# Patient Record
Sex: MALE | Race: WHITE | URBAN - METROPOLITAN AREA
[De-identification: names, ages, dates, MRNs, and addresses within clinical notes are randomized per-mention and may not be internally consistent; named-entity substitution may affect disease eponyms.]

---

## 2018-08-31 ENCOUNTER — HOSPITAL ENCOUNTER (OUTPATIENT)
Dept: MEDSURG UNIT | Facility: HOSPITAL | Age: 38
End: 2018-09-02
Attending: INTERNAL MEDICINE | Admitting: INTERNAL MEDICINE

## 2018-08-31 LAB
ABS NEUT (OLG): 20.31 X10(3)/MCL (ref 2.1–9.2)
ALBUMIN SERPL-MCNC: 1.8 GM/DL (ref 3.4–5)
ALBUMIN/GLOB SERPL: 0 RATIO (ref 1–2)
ALP SERPL-CCNC: 137 UNIT/L (ref 45–117)
ALT SERPL-CCNC: 43 UNIT/L (ref 12–78)
AMPHET UR QL SCN: NEGATIVE
APPEARANCE, UA: CLEAR
APTT PPP: 32.7 SECOND(S) (ref 23.3–37)
AST SERPL-CCNC: 47 UNIT/L (ref 15–37)
BACTERIA #/AREA URNS AUTO: ABNORMAL /[HPF]
BARBITURATE SCN PRESENT UR: NEGATIVE
BASOPHILS NFR BLD MANUAL: 0 %
BENZODIAZ UR QL SCN: POSITIVE
BILIRUB SERPL-MCNC: 0.7 MG/DL (ref 0.2–1)
BILIRUB UR QL STRIP: NEGATIVE
BILIRUBIN DIRECT+TOT PNL SERPL-MCNC: 0.2 MG/DL
BILIRUBIN DIRECT+TOT PNL SERPL-MCNC: 0.5 MG/DL
BUN SERPL-MCNC: 21 MG/DL (ref 7–18)
CALCIUM SERPL-MCNC: 8.7 MG/DL (ref 8.5–10.1)
CANNABINOIDS UR QL SCN: NEGATIVE
CHLORIDE SERPL-SCNC: 95 MMOL/L (ref 98–107)
CO2 SERPL-SCNC: 26 MMOL/L (ref 21–32)
COCAINE UR QL SCN: NEGATIVE
COLOR UR: YELLOW
CREAT SERPL-MCNC: 1.1 MG/DL (ref 0.6–1.3)
CRP SERPL-MCNC: 25 MG/DL
EOSINOPHIL NFR BLD MANUAL: 0 %
ERYTHROCYTE [DISTWIDTH] IN BLOOD BY AUTOMATED COUNT: 13 % (ref 11.5–14.5)
GLOBULIN SER-MCNC: 6.2 GM/ML (ref 2.3–3.5)
GLUCOSE (UA): NORMAL
GLUCOSE SERPL-MCNC: 124 MG/DL (ref 74–106)
GRANULOCYTES NFR BLD MANUAL: 80 % (ref 43–75)
HCT VFR BLD AUTO: 37.4 % (ref 40–51)
HGB BLD-MCNC: 12.8 GM/DL (ref 13.5–17.5)
HGB UR QL STRIP: NEGATIVE
HYALINE CASTS #/AREA URNS LPF: ABNORMAL /[LPF]
INR PPP: 1.31 (ref 0.9–1.2)
KETONES UR QL STRIP: NEGATIVE
LACTATE SERPL-SCNC: 0.9 MMOL/L (ref 0.4–2)
LEUKOCYTE ESTERASE UR QL STRIP: 25 LEU/UL
LYMPHOCYTES NFR BLD MANUAL: 2 %
LYMPHOCYTES NFR BLD MANUAL: 5 % (ref 20.5–51.1)
MCH RBC QN AUTO: 29.3 PG (ref 26–34)
MCHC RBC AUTO-ENTMCNC: 34.2 GM/DL (ref 31–37)
MCV RBC AUTO: 85.6 FL (ref 80–100)
MONOCYTES NFR BLD MANUAL: 4 % (ref 2–9)
MUCOUS THREADS URNS QL MICRO: ABNORMAL
NEUTS BAND NFR BLD MANUAL: 9 % (ref 0–10)
NITRITE UR QL STRIP: NEGATIVE
OPIATES UR QL SCN: POSITIVE
PCP UR QL: NEGATIVE
PH UR STRIP.AUTO: 5.5 [PH] (ref 5–8)
PH UR STRIP: 6 [PH] (ref 4.5–8)
PLATELET # BLD AUTO: 464 X10(3)/MCL (ref 130–400)
PLATELET # BLD EST: ABNORMAL 10*3/UL
PMV BLD AUTO: 10.9 FL (ref 7.4–10.4)
POTASSIUM SERPL-SCNC: 4.3 MMOL/L (ref 3.5–5.1)
PROT SERPL-MCNC: 8 GM/DL (ref 6.4–8.2)
PROT UR QL STRIP: 20 MG/DL
PROTHROMBIN TIME: 15.5 SECOND(S) (ref 11.9–14.4)
RBC # BLD AUTO: 4.37 X10(6)/MCL (ref 4.5–5.9)
RBC #/AREA URNS AUTO: ABNORMAL /[HPF]
RBC MORPH BLD: NORMAL
SODIUM SERPL-SCNC: 130 MMOL/L (ref 136–145)
SP GR UR STRIP: 1.03 (ref 1–1.03)
SQUAMOUS #/AREA URNS LPF: ABNORMAL /[LPF]
TEMPERATURE, URINE (OHS): 25 DEGC (ref 20–25)
UROBILINOGEN UR STRIP-ACNC: 8 MG/DL
WBC # SPEC AUTO: 24.6 X10(3)/MCL (ref 4.5–11)
WBC #/AREA URNS AUTO: ABNORMAL /HPF

## 2018-09-01 LAB
ABS NEUT (OLG): 16.07 X10(3)/MCL (ref 2.1–9.2)
ALBUMIN SERPL-MCNC: 1.6 GM/DL (ref 3.4–5)
ALBUMIN/GLOB SERPL: 0 RATIO (ref 1–2)
ALP SERPL-CCNC: 144 UNIT/L (ref 45–117)
ALT SERPL-CCNC: 67 UNIT/L (ref 12–78)
AST SERPL-CCNC: 96 UNIT/L (ref 15–37)
BASOPHILS # BLD AUTO: 0.07 X10(3)/MCL
BASOPHILS NFR BLD AUTO: 0 %
BILIRUB SERPL-MCNC: 0.9 MG/DL (ref 0.2–1)
BILIRUBIN DIRECT+TOT PNL SERPL-MCNC: 0.3 MG/DL
BILIRUBIN DIRECT+TOT PNL SERPL-MCNC: 0.6 MG/DL
BUN SERPL-MCNC: 21 MG/DL (ref 7–18)
CALCIUM SERPL-MCNC: 8.1 MG/DL (ref 8.5–10.1)
CHLORIDE SERPL-SCNC: 100 MMOL/L (ref 98–107)
CO2 SERPL-SCNC: 26 MMOL/L (ref 21–32)
CORTIS SERPL-SCNC: 24 MCG/DL
CREAT SERPL-MCNC: 1 MG/DL (ref 0.6–1.3)
D DIMER PPP IA.FEU-MCNC: 6.2 MCG/ML FEU
EOSINOPHIL # BLD AUTO: 0.05 X10(3)/MCL
EOSINOPHIL NFR BLD AUTO: 0 %
ERYTHROCYTE [DISTWIDTH] IN BLOOD BY AUTOMATED COUNT: 13.2 % (ref 11.5–14.5)
ERYTHROCYTE [SEDIMENTATION RATE] IN BLOOD: 103 MM/HR (ref 0–15)
GLOBULIN SER-MCNC: 5.4 GM/ML (ref 2.3–3.5)
GLUCOSE FLD-MCNC: 8 MG/DL
GLUCOSE SERPL-MCNC: 94 MG/DL (ref 74–106)
HAV IGM SERPL QL IA: NONREACTIVE
HBV CORE IGM SERPL QL IA: NONREACTIVE
HBV SURFACE AG SERPL QL IA: NEGATIVE
HCT VFR BLD AUTO: 33.2 % (ref 40–51)
HCV AB SERPL QL IA: REACTIVE
HGB BLD-MCNC: 11.1 GM/DL (ref 13.5–17.5)
HIV 1+2 AB+HIV1 P24 AG SERPL QL IA: NONREACTIVE
IMM GRANULOCYTES # BLD AUTO: 0.6 10*3/UL
IMM GRANULOCYTES NFR BLD AUTO: 3 %
LDH SERPL-CCNC: 201 UNIT/L (ref 87–241)
LYMPHOCYTES # BLD AUTO: 1.3 X10(3)/MCL
LYMPHOCYTES NFR BLD AUTO: 7 % (ref 13–40)
MCH RBC QN AUTO: 28.8 PG (ref 26–34)
MCHC RBC AUTO-ENTMCNC: 33.4 GM/DL (ref 31–37)
MCV RBC AUTO: 86.2 FL (ref 80–100)
MONOCYTES # BLD AUTO: 1.18 X10(3)/MCL
MONOCYTES NFR BLD AUTO: 6 % (ref 4–12)
NEUTROPHILS # BLD AUTO: 16.07 X10(3)/MCL
NEUTROPHILS NFR BLD AUTO: 83 X10(3)/MCL
OSMOLALITY UR: 746 MOSM/KG (ref 300–1300)
PH BF TYPE: NORMAL
PH FLD: 7.5 [PH]
PLATELET # BLD AUTO: 367 X10(3)/MCL (ref 130–400)
PMV BLD AUTO: 11 FL (ref 7.4–10.4)
POTASSIUM SERPL-SCNC: 4.1 MMOL/L (ref 3.5–5.1)
PROT FLD-MCNC: 5.2 GM/DL
PROT SERPL-MCNC: 7 GM/DL (ref 6.4–8.2)
RBC # BLD AUTO: 3.85 X10(6)/MCL (ref 4.5–5.9)
SODIUM SERPL-SCNC: 133 MMOL/L (ref 136–145)
SODIUM UR-SCNC: 100 MMOL/L
TSH SERPL-ACNC: 1.33 MIU/L (ref 0.36–3.74)
WBC # SPEC AUTO: 19.3 X10(3)/MCL (ref 4.5–11)

## 2018-09-02 LAB
ABS NEUT (OLG): 13.78 X10(3)/MCL
ALBUMIN SERPL-MCNC: 1.6 GM/DL (ref 3.4–5)
ALBUMIN/GLOB SERPL: 0 RATIO (ref 1–2)
ALP SERPL-CCNC: 162 UNIT/L (ref 45–117)
ALT SERPL-CCNC: 110 UNIT/L (ref 12–78)
AST SERPL-CCNC: 117 UNIT/L (ref 15–37)
BASOPHILS NFR BLD MANUAL: 0 %
BILIRUB SERPL-MCNC: 0.5 MG/DL (ref 0.2–1)
BILIRUBIN DIRECT+TOT PNL SERPL-MCNC: 0.2 MG/DL
BILIRUBIN DIRECT+TOT PNL SERPL-MCNC: 0.3 MG/DL
BUN SERPL-MCNC: 14 MG/DL (ref 7–18)
CALCIUM SERPL-MCNC: 8.2 MG/DL (ref 8.5–10.1)
CHLORIDE SERPL-SCNC: 101 MMOL/L (ref 98–107)
CO2 SERPL-SCNC: 24 MMOL/L (ref 21–32)
CREAT SERPL-MCNC: 0.9 MG/DL (ref 0.6–1.3)
EOSINOPHIL NFR BLD MANUAL: 0 %
ERYTHROCYTE [DISTWIDTH] IN BLOOD BY AUTOMATED COUNT: 13 % (ref 11.5–14.5)
GLOBULIN SER-MCNC: 5.8 GM/ML (ref 2.3–3.5)
GLUCOSE SERPL-MCNC: 103 MG/DL (ref 74–106)
GRANULOCYTES NFR BLD MANUAL: 82 % (ref 43–75)
HCT VFR BLD AUTO: 35.5 % (ref 40–51)
HGB BLD-MCNC: 11.9 GM/DL (ref 13.5–17.5)
LYMPHOCYTES NFR BLD MANUAL: 6 % (ref 20.5–51.1)
MCH RBC QN AUTO: 28.7 PG (ref 26–34)
MCHC RBC AUTO-ENTMCNC: 33.5 GM/DL (ref 31–37)
MCV RBC AUTO: 85.7 FL (ref 80–100)
MONOCYTES NFR BLD MANUAL: 4 % (ref 2–9)
NEUTS BAND NFR BLD MANUAL: 8 % (ref 0–10)
PLATELET # BLD AUTO: 428 X10(3)/MCL (ref 130–400)
PLATELET # BLD EST: ABNORMAL 10*3/UL
PMV BLD AUTO: 10.6 FL (ref 7.4–10.4)
POTASSIUM SERPL-SCNC: 3.7 MMOL/L (ref 3.5–5.1)
PROT SERPL-MCNC: 7.4 GM/DL (ref 6.4–8.2)
RBC # BLD AUTO: 4.14 X10(6)/MCL (ref 4.5–5.9)
RBC MORPH BLD: NORMAL
SODIUM SERPL-SCNC: 134 MMOL/L (ref 136–145)
VANCOMYCIN TROUGH SERPL-MCNC: 9.8 MCG/ML (ref 10–20)
WBC # SPEC AUTO: 17 X10(3)/MCL (ref 4.5–11)

## 2018-09-03 LAB — GRAM STN SPEC: NORMAL

## 2018-09-06 LAB — FINAL CULTURE: NORMAL
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
